# Patient Record
Sex: MALE | Race: WHITE | NOT HISPANIC OR LATINO
[De-identification: names, ages, dates, MRNs, and addresses within clinical notes are randomized per-mention and may not be internally consistent; named-entity substitution may affect disease eponyms.]

---

## 2020-01-01 ENCOUNTER — APPOINTMENT (OUTPATIENT)
Dept: RADIATION ONCOLOGY | Facility: CLINIC | Age: 75
End: 2020-01-01
Payer: MEDICARE

## 2020-01-01 ENCOUNTER — NON-APPOINTMENT (OUTPATIENT)
Age: 75
End: 2020-01-01

## 2020-01-01 VITALS
WEIGHT: 187 LBS | BODY MASS INDEX: 34.41 KG/M2 | SYSTOLIC BLOOD PRESSURE: 142 MMHG | TEMPERATURE: 98 F | OXYGEN SATURATION: 99 % | HEIGHT: 62 IN | RESPIRATION RATE: 14 BRPM | DIASTOLIC BLOOD PRESSURE: 74 MMHG | HEART RATE: 64 BPM

## 2020-01-01 VITALS
HEIGHT: 62 IN | DIASTOLIC BLOOD PRESSURE: 68 MMHG | HEART RATE: 74 BPM | WEIGHT: 188 LBS | SYSTOLIC BLOOD PRESSURE: 122 MMHG | TEMPERATURE: 98.2 F | RESPIRATION RATE: 13 BRPM | BODY MASS INDEX: 34.6 KG/M2 | OXYGEN SATURATION: 98 %

## 2020-01-01 VITALS
HEART RATE: 51 BPM | BODY MASS INDEX: 34.78 KG/M2 | WEIGHT: 189 LBS | DIASTOLIC BLOOD PRESSURE: 66 MMHG | HEIGHT: 62 IN | TEMPERATURE: 98.1 F | RESPIRATION RATE: 16 BRPM | SYSTOLIC BLOOD PRESSURE: 165 MMHG | OXYGEN SATURATION: 99 %

## 2020-01-01 VITALS
OXYGEN SATURATION: 99 % | BODY MASS INDEX: 34.41 KG/M2 | TEMPERATURE: 97.8 F | RESPIRATION RATE: 12 BRPM | HEART RATE: 74 BPM | HEIGHT: 62 IN | WEIGHT: 187 LBS | SYSTOLIC BLOOD PRESSURE: 178 MMHG | DIASTOLIC BLOOD PRESSURE: 86 MMHG

## 2020-01-01 VITALS
WEIGHT: 186 LBS | OXYGEN SATURATION: 97 % | BODY MASS INDEX: 34.23 KG/M2 | HEIGHT: 62 IN | HEART RATE: 57 BPM | DIASTOLIC BLOOD PRESSURE: 77 MMHG | TEMPERATURE: 98 F | RESPIRATION RATE: 14 BRPM | SYSTOLIC BLOOD PRESSURE: 165 MMHG

## 2020-01-01 VITALS
SYSTOLIC BLOOD PRESSURE: 166 MMHG | WEIGHT: 188 LBS | DIASTOLIC BLOOD PRESSURE: 83 MMHG | OXYGEN SATURATION: 97 % | HEIGHT: 62 IN | RESPIRATION RATE: 16 BRPM | HEART RATE: 79 BPM | BODY MASS INDEX: 34.6 KG/M2 | TEMPERATURE: 98.6 F

## 2020-01-01 VITALS
BODY MASS INDEX: 34.78 KG/M2 | HEART RATE: 88 BPM | TEMPERATURE: 98.3 F | RESPIRATION RATE: 14 BRPM | HEIGHT: 62 IN | DIASTOLIC BLOOD PRESSURE: 83 MMHG | WEIGHT: 189 LBS | SYSTOLIC BLOOD PRESSURE: 178 MMHG | OXYGEN SATURATION: 98 %

## 2020-01-01 VITALS
SYSTOLIC BLOOD PRESSURE: 124 MMHG | OXYGEN SATURATION: 100 % | HEART RATE: 58 BPM | DIASTOLIC BLOOD PRESSURE: 54 MMHG | HEIGHT: 62 IN | TEMPERATURE: 97.6 F | BODY MASS INDEX: 34.78 KG/M2 | RESPIRATION RATE: 16 BRPM | WEIGHT: 189 LBS

## 2020-01-01 VITALS
SYSTOLIC BLOOD PRESSURE: 160 MMHG | HEART RATE: 56 BPM | RESPIRATION RATE: 16 BRPM | HEIGHT: 62 IN | DIASTOLIC BLOOD PRESSURE: 72 MMHG | OXYGEN SATURATION: 100 % | TEMPERATURE: 98 F

## 2020-01-01 PROCEDURE — 99024 POSTOP FOLLOW-UP VISIT: CPT

## 2020-05-12 PROBLEM — Z00.00 ENCOUNTER FOR PREVENTIVE HEALTH EXAMINATION: Status: ACTIVE | Noted: 2020-05-12

## 2020-05-14 PROBLEM — M10.479 ACUTE GOUT DUE TO OTHER SECONDARY CAUSE INVOLVING TOE: Status: RESOLVED | Noted: 2020-05-14 | Resolved: 2020-05-14

## 2020-05-14 PROBLEM — Z86.79 HISTORY OF HYPERTENSION: Status: RESOLVED | Noted: 2020-05-14 | Resolved: 2020-05-14

## 2020-05-14 PROBLEM — I87.2 VENOUS INSUFFICIENCY: Status: RESOLVED | Noted: 2020-05-14 | Resolved: 2020-05-14

## 2020-05-14 PROBLEM — Z87.39 HISTORY OF KYPHOSCOLIOSIS: Status: RESOLVED | Noted: 2020-05-14 | Resolved: 2020-05-14

## 2020-05-14 RX ORDER — TORSEMIDE 20 MG/1
20 TABLET ORAL
Refills: 0 | Status: ACTIVE | COMMUNITY

## 2020-05-14 RX ORDER — WARFARIN 4 MG/1
TABLET ORAL
Refills: 0 | Status: ACTIVE | COMMUNITY

## 2020-05-14 RX ORDER — LOSARTAN POTASSIUM 100 MG/1
TABLET, FILM COATED ORAL
Refills: 0 | Status: ACTIVE | COMMUNITY

## 2020-05-18 ENCOUNTER — APPOINTMENT (OUTPATIENT)
Dept: RADIATION ONCOLOGY | Facility: CLINIC | Age: 75
End: 2020-05-18
Payer: MEDICARE

## 2020-05-18 VITALS
TEMPERATURE: 97.8 F | DIASTOLIC BLOOD PRESSURE: 75 MMHG | OXYGEN SATURATION: 99 % | RESPIRATION RATE: 16 BRPM | SYSTOLIC BLOOD PRESSURE: 161 MMHG | HEART RATE: 59 BPM | HEIGHT: 62 IN

## 2020-05-18 DIAGNOSIS — M10.479 OTHER SECONDARY GOUT, UNSPECIFIED ANKLE AND FOOT: ICD-10-CM

## 2020-05-18 DIAGNOSIS — Z86.79 PERSONAL HISTORY OF OTHER DISEASES OF THE CIRCULATORY SYSTEM: ICD-10-CM

## 2020-05-18 DIAGNOSIS — Z78.9 OTHER SPECIFIED HEALTH STATUS: ICD-10-CM

## 2020-05-18 DIAGNOSIS — Z87.891 PERSONAL HISTORY OF NICOTINE DEPENDENCE: ICD-10-CM

## 2020-05-18 DIAGNOSIS — Z80.0 FAMILY HISTORY OF MALIGNANT NEOPLASM OF DIGESTIVE ORGANS: ICD-10-CM

## 2020-05-18 DIAGNOSIS — I87.2 VENOUS INSUFFICIENCY (CHRONIC) (PERIPHERAL): ICD-10-CM

## 2020-05-18 DIAGNOSIS — Z87.39 PERSONAL HISTORY OF OTHER DISEASES OF THE MUSCULOSKELETAL SYSTEM AND CONNECTIVE TISSUE: ICD-10-CM

## 2020-05-18 PROCEDURE — 99204 OFFICE O/P NEW MOD 45 MIN: CPT

## 2020-05-18 RX ORDER — HYDROCHLOROTHIAZIDE 25 MG/1
25 TABLET ORAL
Refills: 0 | Status: ACTIVE | COMMUNITY

## 2020-05-18 NOTE — REVIEW OF SYSTEMS
[Hematuria: Grade 0] : Hematuria: Grade 0 [Urinary Incontinence: Grade 0] : Urinary Incontinence: Grade 0  [Urinary Tract Pain: Grade 0] : Urinary Tract Pain: Grade 0 [Urinary Retention: Grade 0] : Urinary Retention: Grade 0 [Urinary Frequency: Grade 0] : Urinary Frequency: Grade 0 [Urinary Urgency: Grade 0] : Urinary Urgency: Grade 0

## 2020-05-18 NOTE — HISTORY OF PRESENT ILLNESS
[FreeTextEntry1] : 74 yr old male presents for prostate cancer. \par Dr. Bromberg- Urologist\par Dr. Rosales- PCP\par Dr. Goldberg- medical Oncologist \par \par Pt was diagnosed in 11/2019. His PSA in 2016- 4.1 and then took a jump in 8/2019 to 18.2 and then recently in 11/2019 to 20.3. \par At the time his prostate was noted to be enlarged but not indurated. He had a biopsy in 11/2019 which revealed : Right base- adenocarcinoma of prostate, [place]'s combined score of 6 (3 + 3) involving 10% of tissue, Right Mid- benign prostatic tissue, Right [place]- atypical small acinar proliferation suspicious for adenocarcinoma. Prostate Left base- Adenocarcinoma of prostate, Jesse's combined score 8 (4 + 4) involving 50% of tissue. Left Mid-benign prostatic tissue. Left [place]- benign prostatic tissue. \par The end of December 2019, pt went to [facility] or [facility] to see if a candidate  for surgery for mucinous neoplasm appendix, was found not to be a candidate and now Dr. Goldberg has been f/u the patient. \par On 3/2/2020 Bone Scan revealed no suspicious uptake.\par On 4/6/2020 CT Abd/Pelvis revealed- no significant change. 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 Grossly stable mild loculated abdominal pelvic ascites and carcinomatosis/pseudomyxoma peritonei. Grossly stable small splenic hypodensity possible subcapsular lesion. No pelvic lymphadenopathy. \par Patient denies any history of hematuria, increased frequency of urination or nocturia.  Denies history of abdominal discomfort or rectal bleed his appetite remains good and weight remains stable.  Patient is sexually active.\par \par He comes for the discussion of his options\par

## 2020-05-18 NOTE — VITALS
[Least Pain Intensity: 0/10] : 0/10 [Maximal Pain Intensity: 0/10] : 0/10 [NoTreatment Scheduled] : no treatment scheduled [ECOG Performance Status: 1 - Restricted in physically strenuous activity but ambulatory and able to carry out work of a light or sedentary nature] : Performance Status: 1 - Restricted in physically strenuous activity but ambulatory and able to carry out work of a light or sedentary nature, e.g., light house work, office work [90: Able to carry normal activity; minor signs or symptoms of disease.] : 90: Able to carry normal activity; minor signs or symptoms of disease.  [90: Minor restrictions in physically strenous activity.] : 90: Minor restrictions in physically strenuous activity.

## 2020-05-18 NOTE — PHYSICAL EXAM
[Normal Sphincter Tone] : normal sphincter tone [No Prostate Nodules] : no prostate nodules [Normal] : normal external genitalia without lesions and no testicular masses [Normal] : oriented to person, place and time, the affect was normal, the mood was normal and not anxious [FreeTextEntry1] : Prostate moderately enlarged.  No palpable nodules.  No blood on examining finger.

## 2020-07-06 ENCOUNTER — APPOINTMENT (OUTPATIENT)
Dept: RADIATION ONCOLOGY | Facility: CLINIC | Age: 75
End: 2020-07-06
Payer: MEDICARE

## 2020-07-06 PROCEDURE — 99213 OFFICE O/P EST LOW 20 MIN: CPT

## 2020-07-06 NOTE — VITALS
[Maximal Pain Intensity: 0/10] : 0/10 [Least Pain Intensity: 0/10] : 0/10 [80: Normal activity with effort; some signs or symptoms of disease.] : 80: Normal activity with effort; some signs or symptoms of disease.  [90: Minor restrictions in physically strenous activity.] : 90: Minor restrictions in physically strenuous activity. [ECOG Performance Status: 1 - Restricted in physically strenuous activity but ambulatory and able to carry out work of a light or sedentary nature] : Performance Status: 1 - Restricted in physically strenuous activity but ambulatory and able to carry out work of a light or sedentary nature, e.g., light house work, office work

## 2020-07-06 NOTE — REVIEW OF SYSTEMS
[Hematuria: Grade 0] : Hematuria: Grade 0 [Urinary Incontinence: Grade 0] : Urinary Incontinence: Grade 0  [Urinary Retention: Grade 0] : Urinary Retention: Grade 0 [Urinary Tract Pain: Grade 0] : Urinary Tract Pain: Grade 0 [Urinary Urgency: Grade 0] : Urinary Urgency: Grade 0 [Ejaculation Disorder: Grade 1 - Diminished ejaculation] : Ejaculation Disorder: Grade 1 - Diminished ejaculation  [Urinary Frequency: Grade 0] : Urinary Frequency: Grade 0 [Erectile Dysfunction: Grade 0] : Erectile Dysfunction: Grade 0 [FreeTextEntry7] : sexually active, able to ejaculate but not able to sustain erection

## 2020-07-06 NOTE — HISTORY OF PRESENT ILLNESS
[FreeTextEntry1] : 74 yr old male presents for prostate cancer. \par Adenocarcinoma of the prostate, clinical stage T1c, Jesse score 4+4 = 8 and a pretreatment PSA of 20.3\par \par Pt was diagnosed in 11/2019. His PSA in 2016- 4.1 and then took a jump in 8/2019 to 18.2 and then recently in 11/2019 to 20.3. \par At the time his prostate was noted to be enlarged but not indurated. He had a biopsy in 11/2019 which revealed : Right base- adenocarcinoma of prostate, [place]'s combined score of 6 (3 + 3) involving 10% of tissue, Right Mid- benign prostatic tissue, Right [place]- atypical small acinar proliferation suspicious for adenocarcinoma. Prostate Left base- Adenocarcinoma of prostate, Jesse's combined score 8 (4 + 4) involving 50% of tissue. Left Mid-benign prostatic tissue. Left [place]- benign prostatic tissue. \par The end of December 2019, pt went to [facility] or [facility] to see if a candidate  for surgery for mucinous neoplasm appendix, was found not to be a candidate and now Dr. Goldberg has been f/u the patient. \par \par On 3/2/2020 Bone Scan revealed no suspicious uptake.\par \par On 4/6/2020 CT Abd/Pelvis revealed- no significant change. 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 Grossly stable mild loculated abdominal pelvic ascites and carcinomatosis/pseudomyxoma peritonei. Grossly stable small splenic hypodensity possible subcapsular lesion. No pelvic lymphadenopathy. \par \par 7/6/2020 Pt presents today for his f/u. Saw Dr. Bromberg on 6/15/20 and got his Eligard injection, pt also took Bicalutamide a week before and after. Next injection will be in September. PSA done 5/2020.\par \par Patient denies any history of hematuria, increased frequency of urination or nocturia.  Denies history of abdominal discomfort or rectal bleed his appetite remains good and weight remains stable.  Patient is sexually active, able to ejaculate but not able to sustain erection.\par \par EPIC -CP score- 6\par AUA- 4

## 2020-08-26 NOTE — HISTORY OF PRESENT ILLNESS
[FreeTextEntry1] : 74 yr old male presents for prostate cancer. Adenocarcinoma of the prostate, clinical stage T1c, Jesse score 4+4 = 8 and a pretreatment PSA of 20.3. He is here for an OTV and has completed 6 of 45 fractions 1080 cGy. He has no issues today at all, denies any bowel or bladder issues. Denies pain. Skin care reiterated today to the patient. \par \par

## 2020-08-26 NOTE — DISEASE MANAGEMENT
[Clinical] : TNM Stage: c [I] : I [MTNM] : 0 [NTNM] : 0 [TTNM] : 1c [de-identified] : completed 6 of 45 fractions 1080cGy

## 2020-08-26 NOTE — VITALS
[Maximal Pain Intensity: 0/10] : 0/10 [Least Pain Intensity: 0/10] : 0/10 [90: Able to carry normal activity; minor signs or symptoms of disease.] : 90: Able to carry normal activity; minor signs or symptoms of disease.  [ECOG Performance Status: 1 - Restricted in physically strenuous activity but ambulatory and able to carry out work of a light or sedentary nature] : Performance Status: 1 - Restricted in physically strenuous activity but ambulatory and able to carry out work of a light or sedentary nature, e.g., light house work, office work [90: Minor restrictions in physically strenous activity.] : 90: Minor restrictions in physically strenuous activity.

## 2020-08-26 NOTE — REVIEW OF SYSTEMS
[Anal Pain: Grade 0] : Anal Pain: Grade 0 [Constipation: Grade 0] : Constipation: Grade 0 [Diarrhea: Grade 0] : Diarrhea: Grade 0 [Rectal Pain: Grade 0] : Rectal Pain: Grade 0 [Hematuria: Grade 0] : Hematuria: Grade 0 [Urinary Retention: Grade 0] : Urinary Retention: Grade 0 [Urinary Incontinence: Grade 0] : Urinary Incontinence: Grade 0  [Urinary Urgency: Grade 0] : Urinary Urgency: Grade 0 [Ejaculation Disorder: Grade 1 - Diminished ejaculation] : Ejaculation Disorder: Grade 1 - Diminished ejaculation  [Urinary Frequency: Grade 0] : Urinary Frequency: Grade 0 [Urinary Tract Pain: Grade 0] : Urinary Tract Pain: Grade 0 [Erectile Dysfunction: Grade 0] : Erectile Dysfunction: Grade 0 [FreeTextEntry7] : sexually active, able to ejaculate but not able to sustain erection

## 2020-09-01 NOTE — REVIEW OF SYSTEMS
[Anal Pain: Grade 0] : Anal Pain: Grade 0 [Constipation: Grade 0] : Constipation: Grade 0 [Diarrhea: Grade 0] : Diarrhea: Grade 0 [Rectal Pain: Grade 0] : Rectal Pain: Grade 0 [Hematuria: Grade 0] : Hematuria: Grade 0 [Urinary Incontinence: Grade 0] : Urinary Incontinence: Grade 0  [Urinary Retention: Grade 0] : Urinary Retention: Grade 0 [Urinary Tract Pain: Grade 0] : Urinary Tract Pain: Grade 0 [Urinary Urgency: Grade 0] : Urinary Urgency: Grade 0 [Urinary Frequency: Grade 0] : Urinary Frequency: Grade 0 [Ejaculation Disorder: Grade 1 - Diminished ejaculation] : Ejaculation Disorder: Grade 1 - Diminished ejaculation  [Erectile Dysfunction: Grade 0] : Erectile Dysfunction: Grade 0 [FreeTextEntry7] : sexually active, able to ejaculate but not able to sustain erection

## 2020-09-08 NOTE — VITALS
[Least Pain Intensity: 0/10] : 0/10 [Maximal Pain Intensity: 0/10] : 0/10 [90: Minor restrictions in physically strenous activity.] : 90: Minor restrictions in physically strenuous activity. [80: Normal activity with effort; some signs or symptoms of disease.] : 80: Normal activity with effort; some signs or symptoms of disease.  [ECOG Performance Status: 0 - Fully active, able to carry on all pre-disease performance without restriction] : Performance Status: 0 - Fully active, able to carry on all pre-disease performance without restriction [Date: ____________] : Patient's last distress assessment performed on [unfilled]. [0 - No Distress] : Distress Level: 0

## 2020-09-08 NOTE — HISTORY OF PRESENT ILLNESS
[FreeTextEntry1] : 74 year old male presents for prostate cancer. Adenocarcinoma of the prostate, clinical stage T1c, Jesse score 4+4 = 8 and a pretreatment PSA of 20.3. He is here for an OTV and has completed 13 of 45 fractions 2340 cGy. He has no issues today at all, denies any bowel or bladder issues. Denies pain. Skin care reiterated today to the patient. He is using Calendula BID. \par \par

## 2020-09-08 NOTE — DISEASE MANAGEMENT
[Clinical] : TNM Stage: c [I] : I [TTNM] : 1c [MTNM] : 0 [NTNM] : 0 [de-identified] : completed 13 of 45 fractions 2340 cGy

## 2020-09-08 NOTE — REVIEW OF SYSTEMS
[Anal Pain: Grade 0] : Anal Pain: Grade 0 [Constipation: Grade 0] : Constipation: Grade 0 [Diarrhea: Grade 0] : Diarrhea: Grade 0 [Rectal Pain: Grade 0] : Rectal Pain: Grade 0 [Hematuria: Grade 0] : Hematuria: Grade 0 [Urinary Retention: Grade 0] : Urinary Retention: Grade 0 [Urinary Incontinence: Grade 0] : Urinary Incontinence: Grade 0  [Urinary Tract Pain: Grade 0] : Urinary Tract Pain: Grade 0 [Urinary Urgency: Grade 0] : Urinary Urgency: Grade 0 [Urinary Frequency: Grade 0] : Urinary Frequency: Grade 0 [Ejaculation Disorder: Grade 1 - Diminished ejaculation] : Ejaculation Disorder: Grade 1 - Diminished ejaculation  [Erectile Dysfunction: Grade 0] : Erectile Dysfunction: Grade 0 [FreeTextEntry7] : sexually active, able to ejaculate but not able to sustain erection

## 2020-09-15 NOTE — REVIEW OF SYSTEMS
[Anal Pain: Grade 0] : Anal Pain: Grade 0 [Constipation: Grade 0] : Constipation: Grade 0 [Diarrhea: Grade 0] : Diarrhea: Grade 0 [Rectal Pain: Grade 0] : Rectal Pain: Grade 0 [Hematuria: Grade 0] : Hematuria: Grade 0 [Urinary Incontinence: Grade 0] : Urinary Incontinence: Grade 0  [Urinary Retention: Grade 0] : Urinary Retention: Grade 0 [Urinary Urgency: Grade 0] : Urinary Urgency: Grade 0 [Urinary Tract Pain: Grade 0] : Urinary Tract Pain: Grade 0 [Urinary Frequency: Grade 1 - Present] : Urinary Frequency: Grade 1 - Present [Ejaculation Disorder: Grade 1 - Diminished ejaculation] : Ejaculation Disorder: Grade 1 - Diminished ejaculation  [Erectile Dysfunction: Grade 0] : Erectile Dysfunction: Grade 0 [Dermatitis Radiation: Grade 0] : Dermatitis Radiation: Grade 0 [FreeTextEntry7] : sexually active, able to ejaculate but not able to sustain erection

## 2020-09-15 NOTE — VITALS
[Maximal Pain Intensity: 0/10] : 0/10 [Least Pain Intensity: 0/10] : 0/10 [80: Normal activity with effort; some signs or symptoms of disease.] : 80: Normal activity with effort; some signs or symptoms of disease.  [80: Active, but tires more quickly] : 80: Active, but tires more quickly [ECOG Performance Status: 1 - Restricted in physically strenuous activity but ambulatory and able to carry out work of a light or sedentary nature] : Performance Status: 1 - Restricted in physically strenuous activity but ambulatory and able to carry out work of a light or sedentary nature, e.g., light house work, office work [Date: ____________] : Patient's last distress assessment performed on [unfilled]. [0 - No Distress] : Distress Level: 0

## 2020-09-15 NOTE — HISTORY OF PRESENT ILLNESS
[FreeTextEntry1] : 74 year old male presents for prostate cancer. Adenocarcinoma of the prostate, clinical stage T1c, Jesse score 4+4 = 8 and a pretreatment PSA of 20.3. He is here for an OTV and has completed 19 of 45 fractions 3420 cGy13 of 45 fractions 2340 cGy. He has no issues today at all, denies any bowel or bladder issues. Denies pain.States has nocturia x3 now per night. He continues to use Calendula BID. \par \par

## 2020-09-15 NOTE — DISEASE MANAGEMENT
[Clinical] : TNM Stage: c [I] : I [TTNM] : 1c [NTNM] : 0 [MTNM] : 0 [de-identified] : completed 19 of 45 fractions 3420 cGy

## 2020-09-22 NOTE — DISEASE MANAGEMENT
[Clinical] : TNM Stage: c [I] : I [TTNM] : 1c [NTNM] : 0 [MTNM] : 0 [de-identified] : completed 24 of 45 fractions 4320 cGy

## 2020-09-22 NOTE — REVIEW OF SYSTEMS
[Anal Pain: Grade 0] : Anal Pain: Grade 0 [Constipation: Grade 0] : Constipation: Grade 0 [Diarrhea: Grade 0] : Diarrhea: Grade 0 [Rectal Pain: Grade 0] : Rectal Pain: Grade 0 [Hematuria: Grade 0] : Hematuria: Grade 0 [Urinary Incontinence: Grade 0] : Urinary Incontinence: Grade 0  [Urinary Retention: Grade 0] : Urinary Retention: Grade 0 [Urinary Tract Pain: Grade 0] : Urinary Tract Pain: Grade 0 [Urinary Urgency: Grade 0] : Urinary Urgency: Grade 0 [Urinary Frequency: Grade 1 - Present] : Urinary Frequency: Grade 1 - Present [Ejaculation Disorder: Grade 1 - Diminished ejaculation] : Ejaculation Disorder: Grade 1 - Diminished ejaculation  [Erectile Dysfunction: Grade 0] : Erectile Dysfunction: Grade 0 [Dermatitis Radiation: Grade 0] : Dermatitis Radiation: Grade 0 [FreeTextEntry7] : sexually active, able to ejaculate but not able to sustain erection

## 2020-09-29 NOTE — HISTORY OF PRESENT ILLNESS
[FreeTextEntry1] : 74 year old male presents for prostate cancer. Adenocarcinoma of the prostate, clinical stage T1c, Jesse score 4+4 = 8 and a pretreatment PSA of 20.3. He is here for an OTV and has completed 30 of 45 fractions 5400cGy. He reports no issues today at all, denies any bowel or bladder issues. Denies pain today. Complains of nocturia 2-3 times a night. He is using Calendula BID. \par \par

## 2020-09-29 NOTE — REVIEW OF SYSTEMS
[Anal Pain: Grade 0] : Anal Pain: Grade 0 [Constipation: Grade 0] : Constipation: Grade 0 [Diarrhea: Grade 0] : Diarrhea: Grade 0 [Rectal Pain: Grade 0] : Rectal Pain: Grade 0 [Hematuria: Grade 0] : Hematuria: Grade 0 [Urinary Incontinence: Grade 0] : Urinary Incontinence: Grade 0  [Urinary Retention: Grade 0] : Urinary Retention: Grade 0 [Urinary Tract Pain: Grade 0] : Urinary Tract Pain: Grade 0 [Urinary Urgency: Grade 0] : Urinary Urgency: Grade 0 [Urinary Frequency: Grade 1 - Present] : Urinary Frequency: Grade 1 - Present [Ejaculation Disorder: Grade 1 - Diminished ejaculation] : Ejaculation Disorder: Grade 1 - Diminished ejaculation  [Erectile Dysfunction: Grade 0] : Erectile Dysfunction: Grade 0 [Dermatitis Radiation: Grade 0] : Dermatitis Radiation: Grade 0 [Dyspepsia: Grade 0] : Dyspepsia: Grade 0 [Dysphagia: Grade 0] : Dysphagia: Grade 0 [Esophagitis: Grade 0] : Esophagitis: Grade 0 [Fecal Incontinence: Grade 0] : Fecal Incontinence: Grade 0 [Gastroparesis: Grade 0] : Gastroparesis: Grade 0 [Nausea: Grade 0] : Nausea: Grade 0 [Proctitis: Grade 0] : Proctitis: Grade 0 [Small Intestinal Obstruction: Grade 0] : Small Intestinal Obstruction: Grade 0 [Vomiting: Grade 0] : Vomiting: Grade 0 [Alopecia: Grade 0] : Alopecia: Grade 0 [Pruritus: Grade 0] : Pruritus: Grade 0 [Skin Atrophy: Grade 0] : Skin Atrophy: Grade 0 [Skin Hyperpigmentation: Grade 0] : Skin Hyperpigmentation: Grade 0 [Skin Induration: Grade 0] : Skin Induration: Grade 0 [FreeTextEntry7] : sexually active, able to ejaculate but not able to sustain erection

## 2020-09-29 NOTE — DISEASE MANAGEMENT
[Clinical] : TNM Stage: c [I] : I [TTNM] : 1c [NTNM] : 0 [MTNM] : 0 [de-identified] : completed 30 of 45 fractions  5400 cGy

## 2020-10-08 NOTE — HISTORY OF PRESENT ILLNESS
[FreeTextEntry1] : 74 year old male presents for prostate cancer. Adenocarcinoma of the prostate, clinical stage T1c, Jesse score 4+4 = 8 and a pretreatment PSA of 20.3. He is here for an OTV and has completed 36 of 45 fractions 6480 cGy. He reports no issues today. He denies any bowel or bladder issues. Denies pain today. Complains of nocturia 2-3 times a night. He is using Calendula BID. \par \par

## 2020-10-08 NOTE — DISEASE MANAGEMENT
[Clinical] : TNM Stage: c [I] : I [TTNM] : 1c [NTNM] : 0 [MTNM] : 0 [de-identified] : completed 36 of 45 fractions  6480 cGy

## 2020-10-20 NOTE — DISEASE MANAGEMENT
[Clinical] : TNM Stage: c [I] : I [TTNM] : 1c [MTNM] : 0 [NTNM] : 0 [de-identified] : completed 44 of 45 fractions  7920 cGy

## 2020-10-20 NOTE — DISEASE MANAGEMENT
[Clinical] : TNM Stage: c [I] : I [TTNM] : 1c [NTNM] : 0 [de-identified] : completed 39 of 45 fractions  7020 cGy [MTNM] : 0

## 2020-10-20 NOTE — REVIEW OF SYSTEMS
[Anal Pain: Grade 0] : Anal Pain: Grade 0 [Constipation: Grade 0] : Constipation: Grade 0 [Diarrhea: Grade 0] : Diarrhea: Grade 0 [Dyspepsia: Grade 0] : Dyspepsia: Grade 0 [Dysphagia: Grade 0] : Dysphagia: Grade 0 [Esophagitis: Grade 0] : Esophagitis: Grade 0 [Fecal Incontinence: Grade 0] : Fecal Incontinence: Grade 0 [Gastroparesis: Grade 0] : Gastroparesis: Grade 0 [Nausea: Grade 0] : Nausea: Grade 0 [Proctitis: Grade 0] : Proctitis: Grade 0 [Rectal Pain: Grade 0] : Rectal Pain: Grade 0 [Small Intestinal Obstruction: Grade 0] : Small Intestinal Obstruction: Grade 0 [Vomiting: Grade 0] : Vomiting: Grade 0 [Hematuria: Grade 0] : Hematuria: Grade 0 [Urinary Incontinence: Grade 0] : Urinary Incontinence: Grade 0  [Urinary Retention: Grade 0] : Urinary Retention: Grade 0 [Urinary Tract Pain: Grade 0] : Urinary Tract Pain: Grade 0 [Urinary Urgency: Grade 0] : Urinary Urgency: Grade 0 [Urinary Frequency: Grade 1 - Present] : Urinary Frequency: Grade 1 - Present [Ejaculation Disorder: Grade 1 - Diminished ejaculation] : Ejaculation Disorder: Grade 1 - Diminished ejaculation  [Erectile Dysfunction: Grade 0] : Erectile Dysfunction: Grade 0 [Alopecia: Grade 0] : Alopecia: Grade 0 [Pruritus: Grade 0] : Pruritus: Grade 0 [Skin Atrophy: Grade 0] : Skin Atrophy: Grade 0 [Skin Hyperpigmentation: Grade 0] : Skin Hyperpigmentation: Grade 0 [Skin Induration: Grade 0] : Skin Induration: Grade 0 [Dermatitis Radiation: Grade 0] : Dermatitis Radiation: Grade 0 [FreeTextEntry7] : sexually active, able to ejaculate but not able to sustain erection

## 2020-10-20 NOTE — REVIEW OF SYSTEMS
[Constipation: Grade 0] : Constipation: Grade 0 [Diarrhea: Grade 0] : Diarrhea: Grade 0 [Anal Pain: Grade 0] : Anal Pain: Grade 0 [Dyspepsia: Grade 0] : Dyspepsia: Grade 0 [Esophagitis: Grade 0] : Esophagitis: Grade 0 [Dysphagia: Grade 0] : Dysphagia: Grade 0 [Fecal Incontinence: Grade 0] : Fecal Incontinence: Grade 0 [Gastroparesis: Grade 0] : Gastroparesis: Grade 0 [Nausea: Grade 0] : Nausea: Grade 0 [Proctitis: Grade 0] : Proctitis: Grade 0 [Rectal Pain: Grade 0] : Rectal Pain: Grade 0 [Small Intestinal Obstruction: Grade 0] : Small Intestinal Obstruction: Grade 0 [Vomiting: Grade 0] : Vomiting: Grade 0 [Edema Limbs: Grade 0] : Edema Limbs: Grade 0  [Fatigue: Grade 0] : Fatigue: Grade 0 [Localized Edema: Grade 0] : Localized Edema: Grade 0  [Neck Edema: Grade 0] : Neck Edema: Grade 0 [Hematuria: Grade 0] : Hematuria: Grade 0 [Urinary Incontinence: Grade 0] : Urinary Incontinence: Grade 0  [Urinary Retention: Grade 0] : Urinary Retention: Grade 0 [Urinary Tract Pain: Grade 0] : Urinary Tract Pain: Grade 0 [Urinary Frequency: Grade 0] : Urinary Frequency: Grade 0 [Urinary Urgency: Grade 1 - Present] : Urinary Urgency: Grade 1 - Present [Erectile Dysfunction: Grade 0] : Erectile Dysfunction: Grade 0 [Ejaculation Disorder: Grade 0] : Ejaculation Disorder: Grade 0 [Cognitive Disturbance: Grade 0] : Cognitive Disturbance: Grade 0 [Concentration Impairment: Grade 0] : Concentration Impairment: Grade 0 [Facial Muscle Weakness: Grade 0] : Facial Muscle Weakness: Grade 0 [Dizziness: Grade 0] : Dizziness: Grade 0  [Headache: Grade 0] : Headache: Grade 0 [Lethargy: Grade 0] : Lethargy: Grade 0 [Meningismus: Grade 0] : Meningismus: Grade 0 [Peripheral Motor Neuropathy: Grade 0] : Peripheral Motor Neuropathy: Grade 0 [Peripheral Sensory Neuropathy: Grade 0] : Peripheral Sensory Neuropathy: Grade 0 [Somnolence: Grade 0] : Somnolence: Grade 0 [Anxiety: Grade 0] : Anxiety: Grade 0  [Depression: Grade 0] : Depression: Grade 0 [Insomnia: Grade 0] : Insomnia: Grade 0 [Libido Decreased: Grade 0] : Libido Decreased: Grade 0 [Cough: Grade 0] : Cough: Grade 0 [Dyspnea: Grade 0] : Dyspnea: Grade 0 [Hiccups: Grade 0] : Hiccups: Grade 0 [Hoarseness: Grade 0] : Hoarseness: Grade 0 [Hypoxia: Grade 0] : Hypoxia: Grade 0 [Pharyngeal Mucositis: Grade 0] : Pharyngeal Mucositis: Grade 0 [Pneumonitis: Grade 0] : Pneumonitis: Grade 0 [Voice Alteration: Grade 0] : Voice Alteration: Grade 0 [Alopecia: Grade 0] : Alopecia: Grade 0 [Pruritus: Grade 0] : Pruritus: Grade 0 [Skin Atrophy: Grade 0] : Skin Atrophy: Grade 0 [Skin Hyperpigmentation: Grade 0] : Skin Hyperpigmentation: Grade 0 [Skin Induration: Grade 0] : Skin Induration: Grade 0 [Dermatitis Radiation: Grade 0] : Dermatitis Radiation: Grade 0

## 2020-10-20 NOTE — HISTORY OF PRESENT ILLNESS
[FreeTextEntry1] : 74 year old male presents for prostate cancer. Adenocarcinoma of the prostate, clinical stage T1c, Jesse score 4+4 = 8 and a pretreatment PSA of 20.3. He is here for an OTV and has completed 39 of 45 fractions 7020 cGy. He reports no issues today. He denies any bowel or bladder issues. Denies pain today. Complains of nocturia 2-3 times a night. He is using Calendula BID. \par \par

## 2020-11-30 NOTE — DISEASE MANAGEMENT
[Clinical] : TNM Stage: c [I] : I [TTNM] : 1c [NTNM] : 0 [MTNM] : 0 [de-identified] : completed 45 of 45 fractions  8160 cGy on 10/21/2020 to the prostate

## 2020-11-30 NOTE — HISTORY OF PRESENT ILLNESS
[FreeTextEntry1] : 11/30/2020\par 74 year old male presents for prostate cancer. Adenocarcinoma of the prostate, clinical stage T1c, Jesse score 4+4 = 8 and a pretreatment PSA of 20.3.\par pt is here today for his one month PTE.  pt completed 45 of 45 fractions  8100 cGy on 10/21/2020 to the prostate. \par \par \par Pt denies pain, urgency, frequency, or incontinence. Pt has nocturia x 1 per night. Pt denies GI issues. Denies any sexual dysfunction. Appetite and hydration are good, as well as, energy level. No cream used. PSA drawn today. Pt saw Dr. Schroeder from [facility] for a physical in October and pt reports all was well.

## 2020-11-30 NOTE — PHYSICAL EXAM
[Normal] : normal external genitalia without lesions and no testicular masses [Normal] : oriented to person, place and time, the affect was normal, the mood was normal and not anxious [FreeTextEntry1] : Prostate small firm, nontender.  No palpable nodules.  No blood in examining finger

## 2020-11-30 NOTE — REVIEW OF SYSTEMS
[Constipation: Grade 0] : Constipation: Grade 0 [Diarrhea: Grade 0] : Diarrhea: Grade 0 [Nausea: Grade 0] : Nausea: Grade 0 [Vomiting: Grade 0] : Vomiting: Grade 0 [Hematuria: Grade 0] : Hematuria: Grade 0 [Urinary Incontinence: Grade 0] : Urinary Incontinence: Grade 0  [Urinary Retention: Grade 0] : Urinary Retention: Grade 0 [Urinary Tract Pain: Grade 0] : Urinary Tract Pain: Grade 0 [Urinary Urgency: Grade 0] : Urinary Urgency: Grade 0 [Urinary Frequency: Grade 0] : Urinary Frequency: Grade 0 [Erectile Dysfunction: Grade 0] : Erectile Dysfunction: Grade 0 [Ejaculation Disorder: Grade 0] : Ejaculation Disorder: Grade 0 [Dermatitis Radiation: Grade 0] : Dermatitis Radiation: Grade 0

## 2021-01-01 ENCOUNTER — APPOINTMENT (OUTPATIENT)
Dept: RADIATION ONCOLOGY | Facility: CLINIC | Age: 76
End: 2021-01-01
Payer: MEDICARE

## 2021-01-01 VITALS
HEART RATE: 62 BPM | TEMPERATURE: 98 F | DIASTOLIC BLOOD PRESSURE: 60 MMHG | RESPIRATION RATE: 16 BRPM | HEIGHT: 62 IN | SYSTOLIC BLOOD PRESSURE: 146 MMHG | OXYGEN SATURATION: 98 %

## 2021-01-01 DIAGNOSIS — C61 MALIGNANT NEOPLASM OF PROSTATE: ICD-10-CM

## 2021-01-01 PROCEDURE — 99212 OFFICE O/P EST SF 10 MIN: CPT

## 2021-06-28 PROBLEM — C61 ADENOCARCINOMA OF PROSTATE: Status: ACTIVE | Noted: 2020-05-18

## 2021-06-28 NOTE — REVIEW OF SYSTEMS
[Fatigue: Grade 0] : Fatigue: Grade 0 [Hematuria: Grade 0] : Hematuria: Grade 0 [Urinary Incontinence: Grade 0] : Urinary Incontinence: Grade 0  [Urinary Retention: Grade 0] : Urinary Retention: Grade 0 [Urinary Tract Pain: Grade 0] : Urinary Tract Pain: Grade 0 [Urinary Urgency: Grade 1 - Present] : Urinary Urgency: Grade 1 - Present [Urinary Frequency: Grade 1 - Present] : Urinary Frequency: Grade 1 - Present

## 2021-06-28 NOTE — HISTORY OF PRESENT ILLNESS
[FreeTextEntry1] : 11/30/2020\par 74 year old male presents for prostate cancer. Adenocarcinoma of the prostate, clinical stage T1c, Jesse score 4+4 = 8 and a pretreatment PSA of 20.3.\par pt is here today for his one month PTE.  pt completed 45 of 45 fractions  8100 cGy on 10/21/2020 to the prostate. \par \par \par Pt denies pain, urgency, frequency, or incontinence. Pt has nocturia x 1 per night. Pt denies GI issues. Denies any sexual dysfunction. Appetite and hydration are good, as well as, energy level. No cream used. PSA drawn today. Pt saw Dr. Schroeder from [facility] for a physical in October and pt reports all was well.\par \par 6/28/2021\par Mr. Champagne is a 74 year old male presents for prostate cancer. Adenocarcinoma of the prostate, clinical stage T1c, Jesse score 4+4 = 8 and a pretreatment PSA of 20.3. He  completed 45 of 45 fractions  8100 cGy on 10/21/2020 to the prostate. \par 6/21/2021 PSA- 0.4  in addition to a visit with Dr.Bromberg. He is overall feeling well. \par in 1/2021 he fella malena and broke his hip and had surgery here at [facility] and then to subacute rehab for 12 weeks. He is mending nicely. \par He states that his urinary symptoms have improved. He denies any pain or nocturia. He does have some urinary frequency and urgency but that is variable. He is seeing Dr. Goldberg tomorrow and will deal with recent anemia. Appetite is good and weight is stable \par

## 2021-06-28 NOTE — PHYSICAL EXAM
[Normal] : oriented to person, place and time, the affect was normal, the mood was normal and not anxious [FreeTextEntry1] : Prostate small, soft, nontender.  No palpable nodules.  No blood on the examining finger

## 2021-06-28 NOTE — DISEASE MANAGEMENT
[Clinical] : TNM Stage: c [I] : I [TTNM] : 1c [NTNM] : 0 [MTNM] : 0 [de-identified] : completed 45 of 45 fractions  8160 cGy on 10/21/2020 to the prostate

## 2022-06-27 ENCOUNTER — APPOINTMENT (OUTPATIENT)
Dept: RADIATION ONCOLOGY | Facility: CLINIC | Age: 77
End: 2022-06-27